# Patient Record
Sex: MALE | Race: WHITE | NOT HISPANIC OR LATINO | Employment: OTHER | URBAN - METROPOLITAN AREA
[De-identification: names, ages, dates, MRNs, and addresses within clinical notes are randomized per-mention and may not be internally consistent; named-entity substitution may affect disease eponyms.]

---

## 2018-01-08 ENCOUNTER — TELEPHONE (OUTPATIENT)
Dept: NEUROLOGY | Facility: HOSPITAL | Age: 78
End: 2018-01-08

## 2018-01-08 DIAGNOSIS — R19.5 ABNORMAL FECES: Primary | ICD-10-CM

## 2018-01-08 DIAGNOSIS — D50.9 IRON DEFICIENCY ANEMIA, UNSPECIFIED IRON DEFICIENCY ANEMIA TYPE: ICD-10-CM

## 2018-01-08 NOTE — TELEPHONE ENCOUNTER
Possible SBE authorized by [employer].  Auth# [id number].  Approved from 1/5/18-2/4/18.  Per [employer], this auth will pay in network.  Thanks  [id number]

## 2018-01-08 NOTE — TELEPHONE ENCOUNTER
Clinic appointment scheduled with wife, Bhumi, on Wednesday, January 17, 2017 at 230pm.  Pt scheduled with SBE on Thursday, January 18, 2018.  Instructions given to eat light meals on Wednesday, January 17 and nothing to eat or drink after midnight.   Bhumi given clinic address and telephone number.

## 2018-01-10 ENCOUNTER — TELEPHONE (OUTPATIENT)
Dept: NEUROLOGY | Facility: HOSPITAL | Age: 78
End: 2018-01-10

## 2018-01-10 NOTE — TELEPHONE ENCOUNTER
Wife notified to eat light meals on day before procedure.  Eating salads, sandwiches, no steak or potatoes.  Wife acknowledged understanding.

## 2018-01-10 NOTE — TELEPHONE ENCOUNTER
----- Message from Palomo Torres sent at 1/10/2018  9:32 AM CST -----  Contact: pt wife  Pt wife called in about wanting to speak with a nurse pt wanted to know about the light meal that he is suppose to have        Pt wife can be reached at [phone] ty

## 2018-01-17 ENCOUNTER — TELEPHONE (OUTPATIENT)
Dept: NEUROLOGY | Facility: HOSPITAL | Age: 78
End: 2018-01-17

## 2018-01-17 ENCOUNTER — LAB VISIT (OUTPATIENT)
Dept: LAB | Facility: HOSPITAL | Age: 78
End: 2018-01-17
Attending: INTERNAL MEDICINE
Payer: MEDICARE

## 2018-01-17 ENCOUNTER — OFFICE VISIT (OUTPATIENT)
Dept: NEUROLOGY | Facility: HOSPITAL | Age: 78
End: 2018-01-17
Attending: INTERNAL MEDICINE
Payer: MEDICARE

## 2018-01-17 VITALS
TEMPERATURE: 97 F | DIASTOLIC BLOOD PRESSURE: 62 MMHG | WEIGHT: 228.63 LBS | SYSTOLIC BLOOD PRESSURE: 111 MMHG | HEIGHT: 72 IN | BODY MASS INDEX: 30.97 KG/M2 | HEART RATE: 63 BPM

## 2018-01-17 DIAGNOSIS — D50.0 ANEMIA DUE TO CHRONIC BLOOD LOSS: ICD-10-CM

## 2018-01-17 DIAGNOSIS — D50.0 ANEMIA DUE TO CHRONIC BLOOD LOSS: Primary | ICD-10-CM

## 2018-01-17 LAB
ALBUMIN SERPL BCP-MCNC: 3.7 G/DL
ALP SERPL-CCNC: 95 U/L
ALT SERPL W/O P-5'-P-CCNC: 12 U/L
ANION GAP SERPL CALC-SCNC: 7 MMOL/L
AST SERPL-CCNC: 18 U/L
BASOPHILS # BLD AUTO: 0.02 K/UL
BASOPHILS NFR BLD: 0.3 %
BILIRUB SERPL-MCNC: 0.5 MG/DL
BUN SERPL-MCNC: 28 MG/DL
CALCIUM SERPL-MCNC: 9.3 MG/DL
CHLORIDE SERPL-SCNC: 105 MMOL/L
CO2 SERPL-SCNC: 27 MMOL/L
CREAT SERPL-MCNC: 1.6 MG/DL
DIFFERENTIAL METHOD: ABNORMAL
EOSINOPHIL # BLD AUTO: 0.2 K/UL
EOSINOPHIL NFR BLD: 3.5 %
ERYTHROCYTE [DISTWIDTH] IN BLOOD BY AUTOMATED COUNT: 15.1 %
EST. GFR  (AFRICAN AMERICAN): 48 ML/MIN/1.73 M^2
EST. GFR  (NON AFRICAN AMERICAN): 41 ML/MIN/1.73 M^2
FERRITIN SERPL-MCNC: 47 NG/ML
GLUCOSE SERPL-MCNC: 136 MG/DL
HCT VFR BLD AUTO: 33.8 %
HGB BLD-MCNC: 10.8 G/DL
IRON SERPL-MCNC: 53 UG/DL
LYMPHOCYTES # BLD AUTO: 1.1 K/UL
LYMPHOCYTES NFR BLD: 19.5 %
MCH RBC QN AUTO: 27.8 PG
MCHC RBC AUTO-ENTMCNC: 32 G/DL
MCV RBC AUTO: 87 FL
MONOCYTES # BLD AUTO: 0.6 K/UL
MONOCYTES NFR BLD: 11 %
NEUTROPHILS # BLD AUTO: 3.8 K/UL
NEUTROPHILS NFR BLD: 65.5 %
PLATELET # BLD AUTO: 125 K/UL
PMV BLD AUTO: 8.4 FL
POTASSIUM SERPL-SCNC: 4.7 MMOL/L
PROT SERPL-MCNC: 7 G/DL
RBC # BLD AUTO: 3.88 M/UL
SATURATED IRON: 13 %
SODIUM SERPL-SCNC: 139 MMOL/L
TOTAL IRON BINDING CAPACITY: 401 UG/DL
TRANSFERRIN SERPL-MCNC: 271 MG/DL
WBC # BLD AUTO: 5.74 K/UL

## 2018-01-17 PROCEDURE — 36415 COLL VENOUS BLD VENIPUNCTURE: CPT

## 2018-01-17 PROCEDURE — 85025 COMPLETE CBC W/AUTO DIFF WBC: CPT

## 2018-01-17 PROCEDURE — 83540 ASSAY OF IRON: CPT

## 2018-01-17 PROCEDURE — 80053 COMPREHEN METABOLIC PANEL: CPT

## 2018-01-17 PROCEDURE — 99213 OFFICE O/P EST LOW 20 MIN: CPT | Performed by: INTERNAL MEDICINE

## 2018-01-17 PROCEDURE — 82728 ASSAY OF FERRITIN: CPT

## 2018-01-17 RX ORDER — ATORVASTATIN CALCIUM 20 MG/1
20 TABLET, FILM COATED ORAL
COMMUNITY

## 2018-01-17 RX ORDER — LEVOFLOXACIN 750 MG/1
TABLET ORAL
COMMUNITY
Start: 2017-12-26

## 2018-01-17 RX ORDER — CARVEDILOL 12.5 MG/1
TABLET ORAL
COMMUNITY
Start: 2017-12-28

## 2018-01-17 RX ORDER — LEVOTHYROXINE SODIUM 25 UG/1
25 TABLET ORAL DAILY
COMMUNITY

## 2018-01-17 RX ORDER — PANTOPRAZOLE SODIUM 40 MG/1
40 TABLET, DELAYED RELEASE ORAL
COMMUNITY

## 2018-01-17 RX ORDER — ASPIRIN 81 MG/1
81 TABLET ORAL
COMMUNITY
End: 2018-01-17

## 2018-01-17 RX ORDER — AMLODIPINE BESYLATE 5 MG/1
5 TABLET ORAL
COMMUNITY

## 2018-01-17 RX ORDER — WITCH HAZEL 50 %
2000 PADS, MEDICATED (EA) TOPICAL
COMMUNITY

## 2018-01-17 RX ORDER — NIFEDIPINE 30 MG/1
TABLET, FILM COATED, EXTENDED RELEASE ORAL
COMMUNITY
Start: 2017-12-18

## 2018-01-17 RX ORDER — CITALOPRAM 20 MG/1
TABLET, FILM COATED ORAL
COMMUNITY
Start: 2017-12-12

## 2018-01-17 RX ORDER — LISINOPRIL 10 MG/1
10 TABLET ORAL
COMMUNITY

## 2018-01-17 RX ORDER — TRIAMCINOLONE ACETONIDE 1 MG/G
CREAM TOPICAL
COMMUNITY
Start: 2017-12-11

## 2018-01-17 RX ORDER — PNEUMOCOCCAL 13-VALENT CONJUGATE VACCINE 2.2; 2.2; 2.2; 2.2; 2.2; 4.4; 2.2; 2.2; 2.2; 2.2; 2.2; 2.2; 2.2 UG/.5ML; UG/.5ML; UG/.5ML; UG/.5ML; UG/.5ML; UG/.5ML; UG/.5ML; UG/.5ML; UG/.5ML; UG/.5ML; UG/.5ML; UG/.5ML; UG/.5ML
INJECTION, SUSPENSION INTRAMUSCULAR
COMMUNITY
Start: 2017-10-25 | End: 2018-01-17

## 2018-01-17 NOTE — PATIENT INSTRUCTIONS
Labs today, 1st floor diagnostics.    Pt to be notified by Endoscopy with arrival time, later today.

## 2018-01-17 NOTE — H&P
[facility]    CC: anemia    HPI 77 y.o. male with history of CAD s/p stent placement 5 years ago presenting with chronic persistent normocytic JEREMIE associated with dark black stools, fatigue, and requiring blood transfusions in the past.  Anemia started 5 years ago after cardiac stent placement and was precipitated by dual antiplatelet therapy (aspirin and plavix).  At that time he required blood transfusions and had a interoperative enteroscopy that was negative for bleeding.  The Plavix was stopped after 6 months.  He continued to have anemia and had a EGD where they cauterized and tatooed a SB angioectasia 2 years ago.  He was asymptomatic for about one year, but then started to have melena and anemia again.  He has a push EGD with Dr. Mahoney that was negative (couldn't reach bleeding spots), then a subsequent Capsule Endoscopy that showed angiectasias concerning for AVM.  He was referred here for a double balloon enteroscopy to reach the bleeding angiectasias.  He continues to have dark stools, but has not required blood transfusions since the initial incident.  He was on a daily ASA 81mg (stopped 2 weeks ago for procedure), but denies any other blood thinners or antiplatelet therapy.  H was taking Integra daily, but ran out a few days ago.     Denies history of frequent nose bleeds; has donated blood in the past and was never told he could not donate.     Medical records reviewed.  Additional history provided by family at bedside.     PMH  CAD    PSH:  Bilateral knee replacement  Cardiac Stent x 1    Social History  Former smoker, denies EtOH or illicit drug use.     FH:  No family history of colon or gastric cancers.     Review of Systems  General ROS: +fatigue, negative for chills, fever or weight loss  Psychological ROS: negative for hallucination, depression or suicidal ideation  Ophthalmic ROS: negative for blurry vision, photophobia or eye pain  ENT ROS: negative for epistaxis, sore throat or  rhinorrhea  Respiratory ROS: no cough, shortness of breath, or wheezing  Cardiovascular ROS: no chest pain or dyspnea on exertion  Gastrointestinal ROS: no abdominal pain, change in bowel habits, +black/ bloody stools  Genito-Urinary ROS: no dysuria, trouble voiding, or hematuria  Musculoskeletal ROS: negative for gait disturbance or muscular weakness  Neurological ROS: no syncope or seizures; no ataxia  Dermatological ROS: negative for pruritis, rash and jaundice    Physical Examination  /62   Pulse 63   Temp 97.4 °F (36.3 °C) (Oral)   Ht 6' (1.829 m)   Wt 103.7 kg (228 lb 9.9 oz)   BMI 31.01 kg/m²   General appearance: alert, cooperative, no distress, elderly mail  HENT: pale conjunctiva, Normocephalic, atraumatic, neck symmetrical, no nasal discharge   Eyes: conjunctivae/corneas clear, PERRL, EOM's intact  Lungs: clear to auscultation bilaterally, no dullness to percussion bilaterally  Heart: regular rate and rhythm without rub; no displacement of the PMI   Abdomen: soft, non-tender; bowel sounds normoactive; no organomegaly  Extremities: extremities symmetric; no clubbing, cyanosis, or edema  Integument: Skin color, texture, turgor normal; no rashes; hair distrubution normal  Neurologic: Alert and oriented X 3, normal strength, normal coordination and gait  Psychiatric: no pressured speech; normal affect; no evidence of impaired cognition     Labs:  Hb/Hct: 10/31  MC: 85    Imaging/Procedure:  VCE 11/16/17: Prominent red spots that could be AVMs    EGD Push 10/16/17: Normal mucosa in proximal jejunum, thought with anatomic difficulties not allowing for deep passage.     Colonoscopy 7/21/16: Moderate diverticulosis of the left side of the colon.   EGD 7/21/16: Hiatal Hernia in the GE junction, Mucosa suggestive of Acosta's esophagus and angioectasia in the jejunum.       Assessment:     77M with persistent JEREMIE likely due to  Chronic GI blood loss from small bowel anigoectasias originally precipitated  by Plavix use 5 years ago.    Plan:   -pre-op labs today  -double balloon enteroscopy tomorrow, 1/18/18  -NPO after midnight    Ryan Coon MD   [address] [place] [address], [address]   [place] [id number] [phone]

## 2018-01-17 NOTE — TELEPHONE ENCOUNTER
Pt instructed to arrive at hospital 1st floor admit on tomorrow, 1/18/18, at 8am.  Pt repeated information correctly.

## 2018-01-17 NOTE — PROGRESS NOTES
[facility]    CC: anemia    HPI 77 y.o. male with history of CAD s/p stent placement 5 years ago presenting with chronic persistent normocytic JEREMIE associated with dark black stools, fatigue, and requiring blood transfusions in the past.  Anemia started 5 years ago after cardiac stent placement and was precipitated by dual antiplatelet therapy (aspirin and plavix).  At that time he required blood transfusions and had a interoperative enteroscopy that was negative for bleeding.  The Plavix was stopped after 6 months.  He continued to have anemia and had a EGD where they cauterized and tatooed a SB angioectasia 2 years ago.  He was asymptomatic for about one year, but then started to have melena and anemia again.  He has a push EGD with Dr. Mahoney that was negative (couldn't reach bleeding spots), then a subsequent Capsule Endoscopy that showed angiectasias concerning for AVM.  He was referred here for a double balloon enteroscopy to reach the bleeding angiectasias.  He continues to have dark stools, but has not required blood transfusions since the initial incident.  He was on a daily ASA 81mg (stopped 2 weeks ago for procedure), but denies any other blood thinners or antiplatelet therapy.  H was taking Integra oral iron daily, but ran out a few days ago.     Denies history of frequent nose bleeds; has donated blood in the past and was never told he could not donate.     Medical records reviewed.  Additional history provided by family at bedside.     PMH  CAD    PSH:  Bilateral knee replacement  Cardiac Stent x 1    Social History  Former smoker, denies EtOH or illicit drug use.     FH:  No family history of colon or gastric cancers.     Review of Systems  General ROS: +fatigue, negative for chills, fever or weight loss  Psychological ROS: negative for hallucination, depression or suicidal ideation  Ophthalmic ROS: negative for blurry vision, photophobia or eye pain  ENT ROS: negative for epistaxis, sore  throat or rhinorrhea  Respiratory ROS: no cough, shortness of breath, or wheezing  Cardiovascular ROS: no chest pain or dyspnea on exertion  Gastrointestinal ROS: no abdominal pain, change in bowel habits, +black/ bloody stools  Genito-Urinary ROS: no dysuria, trouble voiding, or hematuria  Musculoskeletal ROS: negative for gait disturbance or muscular weakness  Neurological ROS: no syncope or seizures; no ataxia  Dermatological ROS: negative for pruritis, rash and jaundice    Physical Examination  /62   Pulse 63   Temp 97.4 °F (36.3 °C) (Oral)   Ht 6' (1.829 m)   Wt 103.7 kg (228 lb 9.9 oz)   BMI 31.01 kg/m²   General appearance: alert, cooperative, no distress, elderly mail  HENT: pale conjunctiva, Normocephalic, atraumatic, neck symmetrical, no nasal discharge   Eyes: conjunctivae/corneas clear, PERRL, EOM's intact  Lungs: clear to auscultation bilaterally, no dullness to percussion bilaterally  Heart: regular rate and rhythm without rub; no displacement of the PMI   Abdomen: soft, non-tender; bowel sounds normoactive; no organomegaly  Extremities: extremities symmetric; no clubbing, cyanosis, or edema  Integument: Skin color, texture, turgor normal; no rashes; hair distrubution normal  Neurologic: Alert and oriented X 3, normal strength, normal coordination and gait  Psychiatric: no pressured speech; normal affect; no evidence of impaired cognition     Labs:  Hb/Hct: 10/31  MC: 85    Imaging/Procedure:  VCE 11/16/17: Prominent red spots that could be AVMs    EGD Push 10/16/17: Normal mucosa in proximal jejunum, thought with anatomic difficulties not allowing for deep passage.     Colonoscopy 7/21/16: Moderate diverticulosis of the left side of the colon.   EGD 7/21/16: Hiatal Hernia in the GE junction, Mucosa suggestive of Acosta's esophagus and angioectasia in the jejunum.     Video capsule reviewed - there is one small red focus in the terminal ileum which may represent a small angioectasia      Assessment:     77M with a previous history of overt GI bleeding ~5 years ago precipitated for DAPT for coronary stent - evaluation at that time included EGD, colonoscopy and intra-operative enteroscopy which were all unrevealing. Since that time, he has been diagnosed with iron deficiency anemia which improved after endoscopic ablation of an angioectasia vs Dieulafoy lesion by push enteroscopy (and marked with Maricruz Ink) ~18 months ago by Dr. Mahoney. His anemia persists although he has not required PRBC transfusion since that ablation. Video capsule shows only one small likely angioectasia in the distal/terminal ileum     Plan:   Upper SBE exam tomorrow to evaluate for additional angioectasias which might be amenable to ablation   Future management will likely include periodic iron infusions.   If this patient's anemia fails to improve with periodic iron infusions, I will consider either 2nd look capsule on plavix or upper DBE on plavix   Patient of DR. Maru Coon MD   [address] [facility], [address]   [place] [id number] [phone]

## 2018-01-18 ENCOUNTER — HOSPITAL ENCOUNTER (OUTPATIENT)
Facility: HOSPITAL | Age: 78
Discharge: HOME | End: 2018-01-18
Attending: INTERNAL MEDICINE | Admitting: INTERNAL MEDICINE
Payer: MEDICARE

## 2018-01-18 ENCOUNTER — ANESTHESIA (OUTPATIENT)
Dept: ENDOSCOPY | Facility: HOSPITAL | Age: 78
End: 2018-01-18
Payer: MEDICARE

## 2018-01-18 ENCOUNTER — ANESTHESIA EVENT (OUTPATIENT)
Dept: ENDOSCOPY | Facility: HOSPITAL | Age: 78
End: 2018-01-18
Payer: MEDICARE

## 2018-01-18 ENCOUNTER — SURGERY (OUTPATIENT)
Age: 78
End: 2018-01-18

## 2018-01-18 VITALS
TEMPERATURE: 99 F | SYSTOLIC BLOOD PRESSURE: 144 MMHG | OXYGEN SATURATION: 95 % | HEART RATE: 61 BPM | DIASTOLIC BLOOD PRESSURE: 69 MMHG | HEIGHT: 72 IN | WEIGHT: 228 LBS | BODY MASS INDEX: 30.88 KG/M2 | RESPIRATION RATE: 18 BRPM

## 2018-01-18 DIAGNOSIS — Z01.818 PREOP EXAMINATION: ICD-10-CM

## 2018-01-18 DIAGNOSIS — D50.0 IRON DEFICIENCY ANEMIA DUE TO CHRONIC BLOOD LOSS: Primary | ICD-10-CM

## 2018-01-18 PROCEDURE — 25000003 PHARM REV CODE 250: Performed by: NURSE ANESTHETIST, CERTIFIED REGISTERED

## 2018-01-18 PROCEDURE — 27201012 HC FORCEPS, HOT/COLD, DISP: Performed by: INTERNAL MEDICINE

## 2018-01-18 PROCEDURE — 37000008 HC ANESTHESIA 1ST 15 MINUTES: Performed by: INTERNAL MEDICINE

## 2018-01-18 PROCEDURE — 93005 ELECTROCARDIOGRAM TRACING: CPT | Mod: 59

## 2018-01-18 PROCEDURE — 93010 ELECTROCARDIOGRAM REPORT: CPT | Mod: ,,, | Performed by: INTERNAL MEDICINE

## 2018-01-18 PROCEDURE — 25000003 PHARM REV CODE 250: Performed by: INTERNAL MEDICINE

## 2018-01-18 PROCEDURE — 44378 SMALL BOWEL ENDOSCOPY: CPT | Performed by: INTERNAL MEDICINE

## 2018-01-18 PROCEDURE — 63600175 PHARM REV CODE 636 W HCPCS: Performed by: NURSE ANESTHETIST, CERTIFIED REGISTERED

## 2018-01-18 PROCEDURE — 37000009 HC ANESTHESIA EA ADD 15 MINS: Performed by: INTERNAL MEDICINE

## 2018-01-18 PROCEDURE — 27201238 HC BALLOON, OVERTUBE (ANY): Performed by: INTERNAL MEDICINE

## 2018-01-18 RX ORDER — PROPOFOL 10 MG/ML
VIAL (ML) INTRAVENOUS
Status: DISCONTINUED | OUTPATIENT
Start: 2018-01-18 | End: 2018-01-18

## 2018-01-18 RX ORDER — LIDOCAINE HCL/PF 100 MG/5ML
SYRINGE (ML) INTRAVENOUS
Status: DISCONTINUED | OUTPATIENT
Start: 2018-01-18 | End: 2018-01-18

## 2018-01-18 RX ORDER — SUCCINYLCHOLINE CHLORIDE 20 MG/ML
INJECTION INTRAMUSCULAR; INTRAVENOUS
Status: DISCONTINUED | OUTPATIENT
Start: 2018-01-18 | End: 2018-01-18

## 2018-01-18 RX ORDER — FENTANYL CITRATE 50 UG/ML
INJECTION, SOLUTION INTRAMUSCULAR; INTRAVENOUS
Status: DISCONTINUED | OUTPATIENT
Start: 2018-01-18 | End: 2018-01-18

## 2018-01-18 RX ORDER — ONDANSETRON 2 MG/ML
INJECTION INTRAMUSCULAR; INTRAVENOUS
Status: DISCONTINUED | OUTPATIENT
Start: 2018-01-18 | End: 2018-01-18

## 2018-01-18 RX ORDER — ONDANSETRON 2 MG/ML
4 INJECTION INTRAMUSCULAR; INTRAVENOUS DAILY PRN
Status: DISCONTINUED | OUTPATIENT
Start: 2018-01-18 | End: 2018-01-18 | Stop reason: HOSPADM

## 2018-01-18 RX ORDER — SODIUM CHLORIDE 0.9 % (FLUSH) 0.9 %
3 SYRINGE (ML) INJECTION
Status: DISCONTINUED | OUTPATIENT
Start: 2018-01-18 | End: 2018-01-18 | Stop reason: HOSPADM

## 2018-01-18 RX ORDER — HYDROMORPHONE HYDROCHLORIDE 2 MG/ML
0.4 INJECTION, SOLUTION INTRAMUSCULAR; INTRAVENOUS; SUBCUTANEOUS EVERY 5 MIN PRN
Status: DISCONTINUED | OUTPATIENT
Start: 2018-01-18 | End: 2018-01-18 | Stop reason: HOSPADM

## 2018-01-18 RX ORDER — NAPROXEN SODIUM 220 MG/1
81 TABLET, FILM COATED ORAL DAILY
COMMUNITY

## 2018-01-18 RX ORDER — SODIUM CHLORIDE 9 MG/ML
INJECTION, SOLUTION INTRAVENOUS CONTINUOUS
Status: DISCONTINUED | OUTPATIENT
Start: 2018-01-18 | End: 2018-01-18 | Stop reason: HOSPADM

## 2018-01-18 RX ADMIN — PROPOFOL 100 MG: 10 INJECTION, EMULSION INTRAVENOUS at 09:01

## 2018-01-18 RX ADMIN — ONDANSETRON 4 MG: 2 INJECTION, SOLUTION INTRAMUSCULAR; INTRAVENOUS at 09:01

## 2018-01-18 RX ADMIN — SUCCINYLCHOLINE CHLORIDE 120 MG: 20 INJECTION, SOLUTION INTRAMUSCULAR; INTRAVENOUS at 09:01

## 2018-01-18 RX ADMIN — PROPOFOL 50 MG: 10 INJECTION, EMULSION INTRAVENOUS at 09:01

## 2018-01-18 RX ADMIN — LIDOCAINE HYDROCHLORIDE 100 MG: 20 INJECTION, SOLUTION INTRAVENOUS at 09:01

## 2018-01-18 RX ADMIN — FENTANYL CITRATE 100 MCG: 50 INJECTION, SOLUTION INTRAMUSCULAR; INTRAVENOUS at 09:01

## 2018-01-18 RX ADMIN — SODIUM CHLORIDE: 0.9 INJECTION, SOLUTION INTRAVENOUS at 07:01

## 2018-01-18 RX ADMIN — EPHEDRINE SULFATE 10 MG: 50 INJECTION, SOLUTION INTRAMUSCULAR; INTRAVENOUS; SUBCUTANEOUS at 10:01

## 2018-01-18 NOTE — TRANSFER OF CARE
Anesthesia Transfer of Care Note    Patient: Charlie Diggs    Procedure(s) Performed: Procedure(s) (LRB):  SMALL BOWEL ENDOSCOPY-UPPER (N/A)    Patient location: PACU    Anesthesia Type: general    Transport from OR: Transported from OR on 100% O2 by closed face mask with adequate spontaneous ventilation    Post pain: adequate analgesia    Post assessment: no apparent anesthetic complications    Post vital signs: stable    Level of consciousness: awake and alert    Nausea/Vomiting: no nausea/vomiting    Complications: none    Transfer of care protocol was followed      Last vitals:   Visit Vitals  /67   Pulse 61   Temp 36.7 °C (98.1 °F) (Oral)   Resp (!) 22   Ht 6' (1.829 m)   Wt 103.4 kg (228 lb)   SpO2 99%   BMI 30.92 kg/m²

## 2018-01-18 NOTE — ANESTHESIA POSTPROCEDURE EVALUATION
Anesthesia Post Evaluation    Patient: Charlie Diggs    Procedure(s) Performed: Procedure(s) (LRB):  SMALL BOWEL ENDOSCOPY-UPPER (N/A)    Final Anesthesia Type: general  Patient location during evaluation: PACU  Patient participation: Yes- Able to Participate  Level of consciousness: awake and alert  Post-procedure vital signs: reviewed and stable  Pain management: adequate  Airway patency: patent  PONV status at discharge: No PONV  Anesthetic complications: no      Cardiovascular status: hemodynamically stable and blood pressure returned to baseline  Respiratory status: room air, spontaneous ventilation and unassisted  Hydration status: euvolemic  Follow-up not needed.        Visit Vitals  BP (!) 144/69   Pulse 61   Temp 37.3 °C (99.2 °F)   Resp 18   Ht 6' (1.829 m)   Wt 103.4 kg (228 lb)   SpO2 95%   BMI 30.92 kg/m²       Pain/Morales Score: Pain Assessment Performed: Yes (1/18/2018 11:29 AM)  Presence of Pain: denies (1/18/2018 11:29 AM)  Morales Score: 10 (1/18/2018 11:29 AM)

## 2018-01-18 NOTE — ANESTHESIA PREPROCEDURE EVALUATION
01/18/2018  Charlie Diggs is a 77 y.o., male for upper DBE under GETA.    Anesthesia Evaluation     I have reviewed the Nursing Notes.   I have reviewed the Medications.     Review of Systems  Social:  Former Smoker, No Alcohol Use   Hematology/Oncology:         -- Anemia:   Cardiovascular:   Exercise tolerance: poor Hypertension CAD  CABG/stent  hyperlipidemia VIDALES ECG has been reviewed.    Pulmonary:   Shortness of breath Sleep Apnea    Renal/:   Chronic Renal Disease, CRI    Neurological:   Seizures        Physical Exam  General:  Obesity    Airway/Jaw/Neck:  Airway Findings: Mouth Opening: Small, but > 3cm Tongue: Normal  General Airway Assessment: Adult  Mallampati: III  TM Distance: Normal, at least 6 cm  Jaw/Neck Findings:  Neck ROM: Normal ROM      Dental:  Dental Findings: Edentulous   Chest/Lungs:  Chest/Lungs Clear    Heart/Vascular:  Heart Findings: Normal       Mental Status:  Mental Status Findings:  Alert and Oriented       Lab Results   Component Value Date    WBC 5.74 01/17/2018    HGB 10.8 (L) 01/17/2018    HCT 33.8 (L) 01/17/2018     (L) 01/17/2018    ALT 12 01/17/2018    AST 18 01/17/2018     01/17/2018    K 4.7 01/17/2018     01/17/2018    CREATININE 1.6 (H) 01/17/2018    BUN 28 (H) 01/17/2018    CO2 27 01/17/2018         Anesthesia Plan  Type of Anesthesia, risks & benefits discussed:  Anesthesia Type:  general  Patient's Preference:   Intra-op Monitoring Plan:   Intra-op Monitoring Plan Comments:   Post Op Pain Control Plan:   Post Op Pain Control Plan Comments:   Induction:    Beta Blocker:  Patient is on a Beta-Blocker and has received one dose within the past 24 hours (No further documentation required).       Informed Consent: Patient understands risks and agrees with Anesthesia plan.  Questions answered. Anesthesia consent signed with patient.  ASA Score: 3      Day of Surgery Review of History & Physical:            Ready For Surgery From Anesthesia Perspective.

## 2018-01-18 NOTE — PLAN OF CARE
Pt awake, without complaints and VSS, abdomen soft. PACU discharge criteria met and release obtained from Dr. Florez. Report called to Deepa FORBES in Endo and pt transported back to [facility] with staff. Family updated by phone.

## 2018-01-18 NOTE — DISCHARGE INSTRUCTIONS
Post Small Bowel Enteroscopy (Antegrade) Discharge Instructions:    Charlie MARTÍN Diggs  1/18/2018  Rayn Coon MD    1. Diet: Try sips of water first. If tolerated, resume your regular diet or one recommended by your physician.  2. Do not drive a car or operate machinery, make critical decisions, or do activities that require coordination or balance for 24 hours.  3. You may experience a sore throat for 24 to 48 hours. You may use throat lozenges or gargle with warm, salt water to relieve the discomfort.  4. Because air was put into your stomach/bowel during the procedure, you may experience some belching.  5. Go directly to the emergency room if you notice any of the following:                              Chills and/or fever over 101                Persistent vomiting or vomiting with blood/nasal regurgitation                Severe abdominal pain, other than gas cramps                Severe chest pain                Black, tarry stools    If you have any questions or problems, please call your Physician:    Ryan Coon MD      If a complication or emergency situation arises and you are unable to reach your Physician - GO TO THE EMERGENCY ROOM.

## 2018-01-22 ENCOUNTER — TELEPHONE (OUTPATIENT)
Dept: ENDOSCOPY | Facility: HOSPITAL | Age: 78
End: 2018-01-22

## 2020-12-31 NOTE — H&P
[facility]     CC: anemia     HPI 77 y.o. male with history of CAD s/p stent placement 5 years ago presenting with chronic persistent normocytic JEREMIE associated with dark black stools, fatigue, and requiring blood transfusions in the past.  Anemia started 5 years ago after cardiac stent placement and was precipitated by dual antiplatelet therapy (aspirin and plavix).  At that time he required blood transfusions and had a interoperative enteroscopy that was negative for bleeding.  The Plavix was stopped after 6 months.  He continued to have anemia and had a EGD where they cauterized and tatooed a SB angioectasia 2 years ago.  He was asymptomatic for about one year, but then started to have melena and anemia again.  He has a push EGD with Dr. Mahoney that was negative (couldn't reach bleeding spots), then a subsequent Capsule Endoscopy that showed angiectasias concerning for AVM.  He was referred here for a double balloon enteroscopy to reach the bleeding angiectasias.  He continues to have dark stools, but has not required blood transfusions since the initial incident.  He was on a daily ASA 81mg (stopped 2 weeks ago for procedure), but denies any other blood thinners or antiplatelet therapy.  H was taking Integra oral iron daily, but ran out a few days ago.      Denies history of frequent nose bleeds; has donated blood in the past and was never told he could not donate.      Medical records reviewed.  Additional history provided by family at bedside.      PMH  CAD     PSH:  Bilateral knee replacement  Cardiac Stent x 1     Social History  Former smoker, denies EtOH or illicit drug use.      FH:  No family history of colon or gastric cancers.      Review of Systems  General ROS: +fatigue, negative for chills, fever or weight loss  Psychological ROS: negative for hallucination, depression or suicidal ideation  Ophthalmic ROS: negative for blurry vision, photophobia or eye pain  ENT ROS: negative for epistaxis,  sore throat or rhinorrhea  Respiratory ROS: no cough, shortness of breath, or wheezing  Cardiovascular ROS: no chest pain or dyspnea on exertion  Gastrointestinal ROS: no abdominal pain, change in bowel habits, +black/ bloody stools  Genito-Urinary ROS: no dysuria, trouble voiding, or hematuria  Musculoskeletal ROS: negative for gait disturbance or muscular weakness  Neurological ROS: no syncope or seizures; no ataxia  Dermatological ROS: negative for pruritis, rash and jaundice     Physical Examination  /62   Pulse 63   Temp 97.4 °F (36.3 °C) (Oral)   Ht 6' (1.829 m)   Wt 103.7 kg (228 lb 9.9 oz)   BMI 31.01 kg/m²   General appearance: alert, cooperative, no distress, elderly mail  HENT: pale conjunctiva, Normocephalic, atraumatic, neck symmetrical, no nasal discharge   Eyes: conjunctivae/corneas clear, PERRL, EOM's intact  Lungs: clear to auscultation bilaterally, no dullness to percussion bilaterally  Heart: regular rate and rhythm without rub; no displacement of the PMI   Abdomen: soft, non-tender; bowel sounds normoactive; no organomegaly  Extremities: extremities symmetric; no clubbing, cyanosis, or edema  Integument: Skin color, texture, turgor normal; no rashes; hair distrubution normal  Neurologic: Alert and oriented X 3, normal strength, normal coordination and gait  Psychiatric: no pressured speech; normal affect; no evidence of impaired cognition      Labs:  Hb/Hct: 10/31  MC: 85     Imaging/Procedure:  VCE 11/16/17: Prominent red spots that could be AVMs     EGD Push 10/16/17: Normal mucosa in proximal jejunum, thought with anatomic difficulties not allowing for deep passage.      Colonoscopy 7/21/16: Moderate diverticulosis of the left side of the colon.   EGD 7/21/16: Hiatal Hernia in the GE junction, Mucosa suggestive of Acosta's esophagus and angioectasia in the jejunum.      Video capsule reviewed - there is one small red focus in the terminal ileum which may represent a small  angioectasia      Assessment:      77M with a previous history of overt GI bleeding ~5 years ago precipitated for DAPT for coronary stent - evaluation at that time included EGD, colonoscopy and intra-operative enteroscopy which were all unrevealing. Since that time, he has been diagnosed with iron deficiency anemia which improved after endoscopic ablation of an angioectasia vs Dieulafoy lesion by push enteroscopy (and marked with Maricruz Ink) ~18 months ago by Dr. Mahoney. His anemia persists although he has not required PRBC transfusion since that ablation. Video capsule shows only one small likely angioectasia in the distal/terminal ileum      Plan:   Upper SBE exam today to evaluate for additional angioectasias which might be amenable to ablation   Future management will likely include periodic iron infusions.   If this patient's anemia fails to improve with periodic iron infusions, I will consider either 2nd look capsule on plavix or upper DBE on plavix   Patient of DR. Maru Coon MD   [address] [place] [address], [address]   [place] [id number] [phone]    No